# Patient Record
Sex: MALE | Race: ASIAN | NOT HISPANIC OR LATINO | URBAN - METROPOLITAN AREA
[De-identification: names, ages, dates, MRNs, and addresses within clinical notes are randomized per-mention and may not be internally consistent; named-entity substitution may affect disease eponyms.]

---

## 2023-04-26 ENCOUNTER — OFFICE VISIT (OUTPATIENT)
Dept: URBAN - METROPOLITAN AREA CLINIC 24 | Facility: CLINIC | Age: 68
End: 2023-04-26
Payer: MEDICARE

## 2023-04-26 DIAGNOSIS — H25.813 COMBINED FORMS OF AGE-RELATED CATARACT, BILATERAL: ICD-10-CM

## 2023-04-26 DIAGNOSIS — H16.223 KERATOCONJUNCTIVITIS SICCA, BILATERAL: ICD-10-CM

## 2023-04-26 DIAGNOSIS — E11.9 DIABETES MELLITUS TYPE 2 WITHOUT MENTION OF COMPLICATION: Primary | ICD-10-CM

## 2023-04-26 DIAGNOSIS — H31.102 CHOROIDAL DEGENERATION, UNSPECIFIED, LEFT EYE: ICD-10-CM

## 2023-04-26 PROCEDURE — 99204 OFFICE O/P NEW MOD 45 MIN: CPT | Performed by: STUDENT IN AN ORGANIZED HEALTH CARE EDUCATION/TRAINING PROGRAM

## 2023-04-26 PROCEDURE — 92134 CPTRZ OPH DX IMG PST SGM RTA: CPT | Performed by: STUDENT IN AN ORGANIZED HEALTH CARE EDUCATION/TRAINING PROGRAM

## 2023-04-26 ASSESSMENT — INTRAOCULAR PRESSURE
OS: 18
OD: 18

## 2023-04-26 ASSESSMENT — VISUAL ACUITY
OD: 20/40
OS: 20/600

## 2023-04-26 ASSESSMENT — KERATOMETRY
OS: 44.33
OD: 43.98

## 2023-04-26 NOTE — IMPRESSION/PLAN
Impression: Combined forms of age-related cataract, bilateral: H25.813. Plan: Discussed cataracts diagnosis & treatment options. Surgical treatment is recommended. Surgical risks and benefits discussed. Patient elects surgical treatment. Recommend surgery OD. Patient is candidate/interested in toric, standard, LenSx and ORA. Pt ed may still need glasses for best corrected vision.  Okay to proceed after retina consult OS

## 2023-04-26 NOTE — IMPRESSION/PLAN
Impression: Diabetes mellitus Type 2 without mention of complication: E34.8. Plan: Pt ed no signs diabetic retinopathy. Discuss benefits of steady blood glucose control and follow up care with PCP. Patient was instructed to monitor vision for sudden changes and to call if visual changes noted.  RTC for annual diabetic eye exam

## 2023-04-26 NOTE — IMPRESSION/PLAN
Impression: Keratoconjunctivitis sicca, bilateral: N23.791. Plan: Dry eyes contribute to the patient's complaints. There is no evidence of permanent changes to the cornea. Explained condition does not have a cure, is a chronic condition and will need consistent artificial tears use for maintenance.  
Start art tears qid prn OU

## 2023-04-26 NOTE — IMPRESSION/PLAN
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 Plan: h/o choroidal atrophy x 2016 on eval with Dr Omaira Morse however did not appear to affect macular with sig better vision. Vision sig decreased 2' to central atrophy. Multifocal round excavated lesions on retina exam. Discussed limitations to vision and cataract surgery will not improve vision. Recommend retina eval next available.

## 2023-05-18 ENCOUNTER — OFFICE VISIT (OUTPATIENT)
Dept: URBAN - METROPOLITAN AREA CLINIC 30 | Facility: CLINIC | Age: 68
End: 2023-05-18
Payer: MEDICARE

## 2023-05-18 DIAGNOSIS — H31.092 CHORIORETINAL SCARS, LEFT EYE: Primary | ICD-10-CM

## 2023-05-18 DIAGNOSIS — H25.813 COMBINED FORMS OF AGE-RELATED CATARACT, BILATERAL: ICD-10-CM

## 2023-05-18 PROCEDURE — 92134 CPTRZ OPH DX IMG PST SGM RTA: CPT | Performed by: OPHTHALMOLOGY

## 2023-05-18 PROCEDURE — 99204 OFFICE O/P NEW MOD 45 MIN: CPT | Performed by: OPHTHALMOLOGY

## 2023-05-18 ASSESSMENT — INTRAOCULAR PRESSURE
OS: 14
OD: 14

## 2023-05-18 NOTE — IMPRESSION/PLAN
Impression: Chorioretinal scars, left eye: H31.092. Plan: atrophic scars, longstanding. poor historian, possible old resolved infection.  limits acuity, monitor

## 2023-05-18 NOTE — IMPRESSION/PLAN
Impression: Combined forms of age-related cataract, bilateral: H25.813. Plan: visually significant cataract OD, no retinal holes, breaks, tears, or detachments.  ok to proceed with cataract surgery OD

OS vision limited by retina

## 2023-06-15 ENCOUNTER — ADULT PHYSICAL (OUTPATIENT)
Dept: URBAN - METROPOLITAN AREA CLINIC 24 | Facility: CLINIC | Age: 68
End: 2023-06-15
Payer: MEDICARE

## 2023-06-15 DIAGNOSIS — Z01.818 ENCOUNTER FOR OTHER PREPROCEDURAL EXAMINATION: Primary | ICD-10-CM

## 2023-06-15 DIAGNOSIS — H25.813 COMBINED FORMS OF AGE-RELATED CATARACT, BILATERAL: Primary | ICD-10-CM

## 2023-06-15 PROCEDURE — 99202 OFFICE O/P NEW SF 15 MIN: CPT | Performed by: PHYSICIAN ASSISTANT

## 2023-06-15 ASSESSMENT — PACHYMETRY
OD: 3.45
OS: 3.36
OS: 23.22
OD: 23.05

## 2023-06-21 ENCOUNTER — PRE-OPERATIVE VISIT (OUTPATIENT)
Dept: URBAN - METROPOLITAN AREA CLINIC 24 | Facility: CLINIC | Age: 68
End: 2023-06-21
Payer: MEDICARE

## 2023-06-21 DIAGNOSIS — H52.203 BILATERAL ASTIGMATISM: ICD-10-CM

## 2023-06-21 DIAGNOSIS — H31.092 CHORIORETINAL SCARS, LEFT EYE: Primary | ICD-10-CM

## 2023-06-21 DIAGNOSIS — H25.813 COMBINED FORMS OF AGE-RELATED CATARACT, BILATERAL: ICD-10-CM

## 2023-06-21 DIAGNOSIS — E11.9 DIABETES MELLITUS TYPE 2 WITHOUT MENTION OF COMPLICATION: ICD-10-CM

## 2023-06-21 DIAGNOSIS — Z79.4 LONG TERM (CURRENT) USE OF INSULIN: ICD-10-CM

## 2023-06-21 DIAGNOSIS — H04.123 DRY EYE SYNDROME OF BILATERAL LACRIMAL GLANDS: ICD-10-CM

## 2023-06-21 DIAGNOSIS — H43.393 OTHER VITREOUS OPACITIES, BILATERAL: ICD-10-CM

## 2023-06-21 PROCEDURE — 99214 OFFICE O/P EST MOD 30 MIN: CPT | Performed by: OPHTHALMOLOGY

## 2023-06-21 ASSESSMENT — INTRAOCULAR PRESSURE
OS: 12
OD: 13

## 2023-06-21 NOTE — IMPRESSION/PLAN
Impression: Combined forms of age-related cataract, bilateral: H25.813. Plan: PLAN: (( AIM -0.25  OU (OD only for now): INJECTABLE OU ([address] [facility] 1st choice, [facility] 2nd) plus gtts ou (monocular od/retina os) , NO ORA OU, NO LRI OU: Declined AMP, NOTE LENS TYPE: [id number], Dense, cortical possible trypan, epi possible iris stretch, PSC, monocular - may need w/c discharge , cleared by retina  . .. )) Discussed cataract diagnosis with the patient. Appropriate testing ordered for cataract diagnosis prior to Preop. Risks and benefits of surgical treatment were discussed and understood. Patient desires surgical treatment. Premium options discussed but patient declines and is ok with using glasses after surgery. Patient desires to proceed with surgery OD only for now. Both eyes examined, medically necessary due to impact in activities of daily living. Discussed with pt the need for glasses after surgery. Discussed there is a chance of developing capsular haze after surgery, which may be corrected with laser/yag after surgery. Understands higher risk of complication and delayed healing due to dense cataract. It has been explained to patient that patient must carefully weigh the risks vs. benefits of having surgery on the better seeing eye. Discussed monocular precautions with patient. Advised pt to wear protective eyewear.

## 2023-06-21 NOTE — IMPRESSION/PLAN
Impression: Diabetes mellitus Type 2 without mention of complication: H56.8. Plan: Discussed diet, exercise, nutrition. Good blood sugar and blood pressure control. Maintain follow up with PCP. Discussed with patient, understands this may limit vision after surgery.

## 2023-06-21 NOTE — IMPRESSION/PLAN
Impression: Chorioretinal scars, left eye: H31.092. Plan: seen by retina  Discussed with patient, understands this may limit vision after surgery. Discussed monocular precautions with patient. Advised pt to wear protective eyewear. Discussed signs and symptoms of retinal detachment/pathology  (flashes,floaters, curtain - distortions) as precaution. Patient instructed to call or return to clinic if condition gets worse.

## 2023-06-28 ENCOUNTER — SURGERY (OUTPATIENT)
Dept: URBAN - METROPOLITAN AREA SURGERY 12 | Facility: SURGERY | Age: 68
End: 2023-06-28
Payer: MEDICARE

## 2023-06-28 DIAGNOSIS — H25.813 COMBINED FORMS OF AGE-RELATED CATARACT, BILATERAL: Primary | ICD-10-CM

## 2023-06-28 PROCEDURE — 66984 XCAPSL CTRC RMVL W/O ECP: CPT | Performed by: OPHTHALMOLOGY

## 2023-06-28 RX ORDER — DICLOFENAC SODIUM 1 MG/ML
0.1 % SOLUTION/ DROPS OPHTHALMIC
Qty: 5 | Refills: 2 | Status: ACTIVE
Start: 2023-06-28

## 2023-06-28 RX ORDER — PREDNISOLONE ACETATE 10 MG/ML
1 % SUSPENSION/ DROPS OPHTHALMIC
Qty: 5 | Refills: 2 | Status: ACTIVE
Start: 2023-06-28

## 2023-06-29 ENCOUNTER — POST-OPERATIVE VISIT (OUTPATIENT)
Dept: URBAN - METROPOLITAN AREA CLINIC 24 | Facility: CLINIC | Age: 68
End: 2023-06-29
Payer: MEDICARE

## 2023-06-29 DIAGNOSIS — Z48.810 ENCOUNTER FOR SURGICAL AFTERCARE FOLLOWING SURGERY ON A SENSE ORGAN: Primary | ICD-10-CM

## 2023-06-29 PROCEDURE — 99024 POSTOP FOLLOW-UP VISIT: CPT | Performed by: STUDENT IN AN ORGANIZED HEALTH CARE EDUCATION/TRAINING PROGRAM

## 2023-06-29 ASSESSMENT — INTRAOCULAR PRESSURE: OD: 13

## 2023-06-29 NOTE — IMPRESSION/PLAN
Impression: S/P Cataract Extraction by phacoemulsification with IOL placement OD - 1 Day. Encounter for surgical aftercare following surgery on a sense organ  Z48.810. Plan: Aim -0.25 Cont AT prn Reviewed PA & diclofenac instructions

## 2023-07-20 ENCOUNTER — POST-OPERATIVE VISIT (OUTPATIENT)
Dept: URBAN - METROPOLITAN AREA CLINIC 24 | Facility: CLINIC | Age: 68
End: 2023-07-20
Payer: MEDICARE

## 2023-07-20 DIAGNOSIS — Z48.810 ENCOUNTER FOR SURGICAL AFTERCARE FOLLOWING SURGERY ON A SENSE ORGAN: ICD-10-CM

## 2023-07-20 DIAGNOSIS — H52.4 PRESBYOPIA: Primary | ICD-10-CM

## 2023-07-20 PROCEDURE — 99024 POSTOP FOLLOW-UP VISIT: CPT | Performed by: STUDENT IN AN ORGANIZED HEALTH CARE EDUCATION/TRAINING PROGRAM

## 2023-07-20 ASSESSMENT — INTRAOCULAR PRESSURE
OS: 15
OD: 14

## 2023-07-20 ASSESSMENT — VISUAL ACUITY
OD: 20/20
OS: 20/600

## 2023-07-20 NOTE — IMPRESSION/PLAN
Impression: S/P Cataract Extraction by phacoemulsification with IOL placement OD - 22 Days. Encounter for surgical aftercare following surgery on a sense organ  Z48.810. Plan: Pt edu OD healing well after cataract sx OD. Stop all rx gtts. 
RTC for yearly dilated exam
SRx released per pt request.

## 2024-05-02 ENCOUNTER — OFFICE VISIT (OUTPATIENT)
Dept: URBAN - METROPOLITAN AREA CLINIC 24 | Facility: CLINIC | Age: 69
End: 2024-05-02
Payer: MEDICARE

## 2024-05-02 DIAGNOSIS — H31.092 OTHER CHORIORETINAL SCARS, LEFT EYE: ICD-10-CM

## 2024-05-02 DIAGNOSIS — E11.9 TYPE 2 DIABETES MELLITUS WITHOUT COMPLICATIONS: Primary | ICD-10-CM

## 2024-05-02 DIAGNOSIS — Z79.4 LONG TERM (CURRENT) USE OF INSULIN: ICD-10-CM

## 2024-05-02 DIAGNOSIS — H25.812 COMBINED FORMS OF AGE-RELATED CATARACT, LEFT EYE: ICD-10-CM

## 2024-05-02 PROCEDURE — 92134 CPTRZ OPH DX IMG PST SGM RTA: CPT | Performed by: STUDENT IN AN ORGANIZED HEALTH CARE EDUCATION/TRAINING PROGRAM

## 2024-05-02 PROCEDURE — 92012 INTRM OPH EXAM EST PATIENT: CPT | Performed by: STUDENT IN AN ORGANIZED HEALTH CARE EDUCATION/TRAINING PROGRAM

## 2024-05-02 ASSESSMENT — VISUAL ACUITY: OD: 20/20

## 2024-05-02 ASSESSMENT — KERATOMETRY
OD: 44.08
OS: 44.30

## 2024-05-02 ASSESSMENT — INTRAOCULAR PRESSURE
OD: 14
OS: 13